# Patient Record
Sex: FEMALE | Race: WHITE
[De-identification: names, ages, dates, MRNs, and addresses within clinical notes are randomized per-mention and may not be internally consistent; named-entity substitution may affect disease eponyms.]

---

## 2021-01-19 ENCOUNTER — NURSE TRIAGE (OUTPATIENT)
Dept: OTHER | Facility: CLINIC | Age: 76
End: 2021-01-19

## 2021-02-06 ENCOUNTER — NURSE TRIAGE (OUTPATIENT)
Dept: OTHER | Facility: CLINIC | Age: 76
End: 2021-02-06

## 2021-02-06 NOTE — TELEPHONE ENCOUNTER
Reason for Disposition   [1] Caller requesting a NON-URGENT new prescription or refill AND [2] triager unable to refill per unit policy    Answer Assessment - Initial Assessment Questions  1. NAME of MEDICATION: \"What medicine are you calling about? \"     lexapro    2. QUESTION: Stefani Braden is your question? \"      Needs a refill    3. PRESCRIBING HCP: \"Who prescribed it? \" Reason: if prescribed by specialist, call should be referred to that group. Ruben Harrison, [address] [place] [address]     4. SYMPTOMS: \"Do you have any symptoms? \"      No    5. SEVERITY: If symptoms are present, ask \"Are they mild, moderate or severe? \"      Denies    6. PREGNANCY:  \"Is there any chance that you are pregnant? \" \"When was your last menstrual period? \"    Protocols used: MEDICATION QUESTION CALL-ADULT-    Brief description of triage: no triage    Triage indicates for patient to call back on Monday when office open    Care advice provided, patient verbalizes understanding; denies any other questions or concerns; instructed to call back for any new or worsening symptoms. This triage is a result of a call to [address] [place] [address]. Please do not respond to the triage nurse through this encounter. Any subsequent communication should be directly with the patient.     Pt called and needing a refill on lexapro is out of town in [place] told pt to call back on Monday when office is open

## 2021-05-12 ENCOUNTER — NURSE TRIAGE (OUTPATIENT)
Dept: OTHER | Facility: CLINIC | Age: 76
End: 2021-05-12

## 2021-05-12 NOTE — TELEPHONE ENCOUNTER
provided, patient verbalizes understanding; denies any other questions or concerns; instructed to call back for any new or worsening symptoms. This triage is a result of a call to [address] [place] [address]. Please do not respond to the triage nurse through this encounter. Any subsequent communication should be directly with the patient.

## 2022-01-24 ENCOUNTER — NURSE TRIAGE (OUTPATIENT)
Dept: OTHER | Facility: CLINIC | Age: 77
End: 2022-01-24

## 2022-01-24 NOTE — TELEPHONE ENCOUNTER
Subjective: Caller states \"fell 1.5 weeks ago on ice and now having right lower back pain\"     Current Symptoms: right lower back pain    Onset: 1.5 weeks ago; gradual    Associated Symptoms: NA    Pain Severity: 8/10; sharp; intermittent    Temperature: No     What has been tried: OTC pain relievers-no relief    LMP: No Pregnant: No    Recommended disposition: Go to office now    Care advice provided, patient verbalizes understanding; denies any other questions or concerns; instructed to call back for any new or worsening symptoms. Patient/caller to follow-up with PCP     This triage is a result of a call to [address] [place] [address]. Please do not respond to the triage nurse through this encounter. Any subsequent communication should be directly with the patient.       Reason for Disposition   SEVERE back pain (e.g., excruciating, unable to do any normal activities) and not improved after pain medicine and CARE ADVICE    Protocols used: BACK INJURY-ADULT-[place]

## 2022-04-13 ENCOUNTER — NURSE TRIAGE (OUTPATIENT)
Dept: OTHER | Facility: CLINIC | Age: 77
End: 2022-04-13

## 2022-04-13 NOTE — TELEPHONE ENCOUNTER
Subjective: Caller states \"just received her CT scan results. Had a routine follow up on her shunt. Results show a small acute subdural hematoma. She wanted to message her doctor on her [address] \"     Doesn't know how to get ahold of her Neurologist, asking for a phone number. Looked up phone number for Dr. Stephanie Grimes and provided number to caller. Care advice provided, patient verbalizes understanding; denies any other questions or concerns; instructed to call back for any new or worsening symptoms. Caller going ot follow up with Dr. Luther Valero neurologist    This triage is a result of a call to [address] [place] [address]. Please do not respond to the triage nurse through this encounter. Any subsequent communication should be directly with the patient.     Reason for Disposition   General information question, no triage required and triager able to answer question    Protocols used: INFORMATION ONLY CALL - NO TRIAGE-ADULT-

## 2022-10-22 ENCOUNTER — NURSE TRIAGE (OUTPATIENT)
Dept: OTHER | Facility: CLINIC | Age: 77
End: 2022-10-22

## 2022-10-22 NOTE — TELEPHONE ENCOUNTER
Dayanna Vazquez from [facility] calling to assist a patient in reaching neurologist. She has a shunt in and having some problems. Contacted office and they were to call back yesterday and didn't    Advised ED or contacting on call provider for neurology  Reason for Disposition  [facility] Information question, no triage required and triager able to answer question    Protocols used:  Information Only Call - No Triage-ADULT-

## 2022-11-08 ENCOUNTER — NURSE TRIAGE (OUTPATIENT)
Dept: OTHER | Facility: CLINIC | Age: 77
End: 2022-11-08

## 2022-11-08 NOTE — TELEPHONE ENCOUNTER
Location of patient: [place]    Subjective: Caller states \"Looking for record of covid immunization\"     Current Symptoms: No current symptoms, no triage indicated. Nuha Polo wants to know how to get a record of covid immunizations and if covid booster and flu vaccine would be covered at a local [facility].  RN able to answer questions, patient given   to answer specific plan coverage, patient decline warm transfer to  at this time. Care advice provided, patient verbalizes understanding; denies any other questions or concerns; instructed to call back for any new or worsening symptoms. This triage is a result of a call to [address] [place] [address]. Please do not respond to the triage nurse through this encounter. Any subsequent communication should be directly with the patient. Reason for Disposition   Health Information question, no triage required and triager able to answer question    Protocols used:  Information Only Call - No Triage-ADULT-